# Patient Record
Sex: MALE | Race: BLACK OR AFRICAN AMERICAN | Employment: UNEMPLOYED | URBAN - METROPOLITAN AREA
[De-identification: names, ages, dates, MRNs, and addresses within clinical notes are randomized per-mention and may not be internally consistent; named-entity substitution may affect disease eponyms.]

---

## 2017-05-16 ENCOUNTER — HOSPITAL ENCOUNTER (EMERGENCY)
Age: 27
Discharge: HOME | End: 2017-05-16
Attending: FAMILY MEDICINE
Payer: SELF-PAY

## 2017-05-16 VITALS
RESPIRATION RATE: 16 BRPM | HEART RATE: 68 BPM | BODY MASS INDEX: 53.78 KG/M2 | SYSTOLIC BLOOD PRESSURE: 150 MMHG | TEMPERATURE: 98.9 F | HEIGHT: 64 IN | WEIGHT: 315 LBS | OXYGEN SATURATION: 98 % | DIASTOLIC BLOOD PRESSURE: 99 MMHG

## 2017-05-16 DIAGNOSIS — K08.89 DENTALGIA: ICD-10-CM

## 2017-05-16 DIAGNOSIS — R51.9 RIGHT FACIAL PAIN: Primary | ICD-10-CM

## 2017-05-16 PROCEDURE — 99282 EMERGENCY DEPT VISIT SF MDM: CPT

## 2017-05-16 RX ORDER — HYDROCODONE BITARTRATE AND ACETAMINOPHEN 5; 325 MG/1; MG/1
1-2 TABLET ORAL
Qty: 12 TAB | Refills: 0 | Status: SHIPPED | OUTPATIENT
Start: 2017-05-16 | End: 2020-05-11 | Stop reason: ALTCHOICE

## 2017-05-16 RX ORDER — AMOXICILLIN 500 MG/1
500 TABLET, FILM COATED ORAL 3 TIMES DAILY
Qty: 30 TAB | Refills: 0 | Status: SHIPPED | OUTPATIENT
Start: 2017-05-16 | End: 2017-05-26

## 2017-05-16 RX ORDER — HYDROXYZINE 50 MG/1
50 TABLET, FILM COATED ORAL
COMMUNITY

## 2017-05-16 RX ORDER — IBUPROFEN 600 MG/1
600 TABLET ORAL
Qty: 20 TAB | Refills: 0 | Status: SHIPPED | OUTPATIENT
Start: 2017-05-16

## 2017-05-16 NOTE — LETTER
[facility] [place] 
[facility] 
[address] [id number] 
[phone] Work/School Note Date: 5/16/2017 To Whom It May concern: 
 
Darlene Wheeler was seen and treated today in the emergency room by the following provider(s): 
Attending Provider: Alexsandra Graham MD 
Physician Assistant: Iron Bomwan PA-C. Please excuse from work, Darlene Wheeler of whom may return to work on 5/17/2017. Sincerely, Manuel Ventura PA-C

## 2017-05-17 NOTE — ED TRIAGE NOTES
Pt reports to ED c/c right lower dental pain onset 1 hour ago. Pt reports taking OTC medication with no relief.

## 2017-05-17 NOTE — ED PROVIDER NOTES
HPI Comments: 8:30 pm    Harish Carlisle is a 28 y.o. male presenting to the ED C/O gradually worsening severe right lower dental pain starting 1 hour ago. Pt has taken Excedrin without relief. Associated sxs include left ear pain and left-sided facial pain. Patient denies smoking cigarettes. Pt denies any other symptoms or complaints. Written by FAVIOLA Lance, as dictated by Parker Crouch PA-C        Patient is a 28 y.o. male presenting with dental problem. The history is provided by the patient. No  was used. Dental Pain    This is a new problem. The current episode started 1 to 2 hours ago. The problem occurs constantly. The problem has been gradually worsening. The pain is located in the right lower mouth. The pain is severe. Past Medical History:   Diagnosis Date    Psychiatric disorder     depression, anxiety       History reviewed. No pertinent surgical history. History reviewed. No pertinent family history. Social History     Social History    Marital status:      Spouse name: N/A    Number of children: N/A    Years of education: N/A     Occupational History    Not on file. Social History Main Topics    Smoking status: Never Smoker    Smokeless tobacco: Not on file    Alcohol use Yes    Drug use: Not on file    Sexual activity: Not on file     Other Topics Concern    Not on file     Social History Narrative    No narrative on file         ALLERGIES: Shellfish derived    Review of Systems   Constitutional: Negative for chills and fever. HENT: Positive for dental problem (right lower dental pain) and ear pain (right sided ear pain). Negative for congestion, facial swelling and sore throat. Right sided frontal facial pain   Musculoskeletal: Negative for joint swelling and myalgias. Neurological: Negative for headaches. Hematological: Negative for adenopathy.        Vitals:    05/16/17 2028   BP: (!) 150/99   Pulse: 68 Resp: 16   Temp: 98.9 °F (37.2 °C)   SpO2: 98%   Weight: 158.8 kg (350 lb)   Height: 5' 4\" (1.626 m)            Physical Exam   Constitutional: He appears well-developed and well-nourished. No distress. Male in NAD. Alert. HENT:   Head: Normocephalic and atraumatic. Head is without right periorbital erythema and without left periorbital erythema. Right Ear: External ear normal. No drainage or swelling. Tympanic membrane is not perforated, not erythematous and not bulging. Left Ear: External ear normal. No drainage or swelling. Tympanic membrane is not perforated, not erythematous and not bulging. Nose: Nose normal. No mucosal edema or rhinorrhea. Right sinus exhibits no maxillary sinus tenderness and no frontal sinus tenderness. Left sinus exhibits no maxillary sinus tenderness and no frontal sinus tenderness. Mouth/Throat: Uvula is midline, oropharynx is clear and moist and mucous membranes are normal. No oral lesions. No trismus in the jaw. Dental caries present. No dental abscesses or uvula swelling. No oropharyngeal exudate, posterior oropharyngeal edema, posterior oropharyngeal erythema or tonsillar abscesses. Eyes: Conjunctivae are normal.   Neck: Normal range of motion. Cardiovascular: Normal rate, regular rhythm, normal heart sounds and intact distal pulses. Exam reveals no gallop and no friction rub. No murmur heard. Pulmonary/Chest: Effort normal. No accessory muscle usage. No tachypnea. He has no decreased breath sounds. He has no rhonchi. Musculoskeletal: Normal range of motion. Lymphadenopathy:     He has no cervical adenopathy. Neurological: He is alert. Skin: Skin is warm. He is not diaphoretic. Nursing note and vitals reviewed.      RESULTS:      PULSE OXIMETRY NOTE:  Pulse-ox is 98% on Room air  Interpretation: normal       No orders to display        Labs Reviewed - No data to display    No results found for this or any previous visit (from the past 12 hour(s)). MDM  Number of Diagnoses or Management Options  Dentalgia:   Right facial pain:   Diagnosis management comments: Dental caries, dental abscess, dental fx, TMJ, OM, sinusitis. ED Course     Medications - No data to display  . Procedures                 PROGRESS NOTE:  8:30 pm  Initial assessment performed. Written by Anita Hernandez ED Scribe, as dictated by Kaycee Roque PA-C    Will tx for dental caries. No abscess. FU with dentist. Reasons to RTED discussed with pt. All questions answered. Pt feels comfortable going home at this time. Pt expressed understanding and she agrees with plan. DISCHARGE NOTE:  8:44 PM  Akiko Ji  results have been reviewed with him. He has been counseled regarding his diagnosis, treatment, and plan. He verbally conveys understanding and agreement of the signs, symptoms, diagnosis, treatment and prognosis and additionally agrees to follow up as discussed. He also agrees with the care-plan and conveys that all of his questions have been answered. I have also provided discharge instructions for him that include: educational information regarding their diagnosis and treatment, and list of reasons why they would want to return to the ED prior to their follow-up appointment, should his condition change. The patient and/or family has been provided with education for proper Emergency Department utilization. CLINICAL IMPRESSION:    1. Right facial pain    2. Dentalgia        PLAN: DISCHARGE HOME    Follow-up Information     Follow up With Details Comments Contact Info    A dentist from the list provided       [facility] EMERGENCY DEPT  As needed, If symptoms worsen [address] [place] [id number]  [phone]          Discharge Medication List as of 5/16/2017  8:41 PM      START taking these medications    Details   amoxicillin 500 mg tab Take 500 mg by mouth three (3) times daily for 10 days. , Print, Disp-30 Tab, R-0      HYDROcodone-acetaminophen (NORCO) 5-325 mg per tablet Take 1-2 Tabs by mouth every four (4) hours as needed for Pain. Max Daily Amount: 12 Tabs., Print, Disp-12 Tab, R-0      ibuprofen (MOTRIN) 600 mg tablet Take 1 Tab by mouth every six (6) hours as needed for Pain. Take with food. , Print, Disp-20 Tab, R-0         CONTINUE these medications which have NOT CHANGED    Details   hydrOXYzine HCl (ATARAX) 50 mg tablet Take 50 mg by mouth every six (6) hours as needed for Anxiety. Indications: anxiety, Historical Med             ATTESTATIONS:  This note is prepared by Zaki Chin, acting as Scribe for Erica Hilliard PA-C . Erica Hilliard PA-C : The scribe's documentation has been prepared under my direction and personally reviewed by me in its entirety. I confirm that the note above accurately reflects all work, treatment, procedures, and medical decision making performed by me.

## 2017-05-17 NOTE — ED NOTES
Discharge instructions given to pt. pt verbalized understanding discharge instructions. Patient left emergency department by foot  With self, in good condition. 3 Prescriptions given. Armband removed and shredded.

## 2017-05-17 NOTE — DISCHARGE INSTRUCTIONS
Head or Face Pain: Care Instructions  Your Care Instructions  Common causes of head or face pain are allergies, stress, and injuries. Other causes include tooth problems and sinus infections. Eating certain foods, such as chocolate or cheese, or drinking certain liquids, such as coffee or cola, can cause head pain for some people. If you have mild head pain, you may not need treatment. It is important to watch your symptoms and talk to your doctor if your pain continues or gets worse. Follow-up care is a key part of your treatment and safety. Be sure to make and go to all appointments, and call your doctor if you are having problems. It's also a good idea to know your test results and keep a list of the medicines you take. How can you care for yourself at home? · Take pain medicines exactly as directed. ¨ If the doctor gave you a prescription medicine for pain, take it as prescribed. ¨ If you are not taking a prescription pain medicine, ask your doctor if you can take an over-the-counter pain medicine. · Take it easy for the next few days or longer if you are not feeling well. · Use a warm, moist towel or heating pad set on low to relax tight muscles in your shoulder and neck. Have someone gently massage your neck and shoulders. · Put ice or a cold pack on the area for 10 to 20 minutes at a time. Put a thin cloth between the ice and your skin. When should you call for help? Call 911 anytime you think you may need emergency care. For example, call if:  · You have twitching, jerking, or a seizure. · You passed out (lost consciousness). · You have symptoms of a stroke. These may include:  ¨ Sudden numbness, tingling, weakness, or loss of movement in your face, arm, or leg, especially on only one side of your body. ¨ Sudden vision changes. ¨ Sudden trouble speaking. ¨ Sudden confusion or trouble understanding simple statements. ¨ Sudden problems with walking or balance.   ¨ A sudden, severe headache that is different from past headaches. · You have jaw pain and pain in your chest, shoulder, neck, or arm. Call your doctor now or seek immediate medical care if:  · You have a fever with a stiff neck or a severe headache. · You have nausea and vomiting, or you cannot keep food or liquids down. Watch closely for changes in your health, and be sure to contact your doctor if:  · Your head or face pain does not get better as expected. Where can you learn more? Go to http://dayana-liam.info/. Enter P568 in the search box to learn more about \"Head or Face Pain: Care Instructions. \"  Current as of: May 27, 2016  Content Version: 11.2  © 0684-7273 [employer]. Care instructions adapted under license by [employer] (which disclaims liability or warranty for this information). If you have questions about a medical condition or this instruction, always ask your healthcare professional. [place] [address] any warranty or liability for your use of this information. Dental Pain: After Your Visit  Your Care Instructions  The most common cause of dental pain is tooth decay. It can also be caused by an infection of the tooth (abscess) or gum, a tooth that has not broken all the way through the gum (impacted tooth), or a problem with the nerve-filled center of the tooth. Follow-up care is a key part of your treatment and safety. Be sure to make and go to all appointments, and call your doctor if you are having problems. Its also a good idea to know your test results and keep a list of the medicines you take. How can you care for yourself at home? · Contact a dentist for follow-up care. · Put ice or a cold pack on the outside of your mouth for 10 to 20 minutes at a time to reduce pain and swelling. Put a thin cloth between the ice and your skin.   · Take an over-the-counter pain medicine, such as acetaminophen (Tylenol), ibuprofen (Advil, Motrin), or naproxen (Brooks). Read and follow all instructions on the label. · Do not take two or more pain medicines at the same time unless the doctor told you to. Many pain medicines have acetaminophen, which is Tylenol. Too much acetaminophen (Tylenol) can be harmful. · Rinse your mouth with warm salt water every 2 hours to help relieve pain and swelling from an infected tooth. Mix 1 teaspoon of salt in 8 ounces of water. · If your doctor prescribed antibiotics, take them as directed. Do not stop taking them just because you feel better. You need to take the full course of antibiotics. When should you call for help? Call your doctor now or seek immediate medical care if:  · You have signs of infection, such as:  ¨ Increased pain, swelling, warmth, or redness. ¨ Pus draining from the gum, tooth, or face. ¨ A fever. Watch closely for changes in your health, and be sure to contact your doctor if:  · You do not get better as expected. Where can you learn more? Go to kontoblick.be  Enter V264 in the search box to learn more about \"Dental Pain: After Your Visit. \"   © 0121-3404 [employer]. Care instructions adapted under license by [employer] (which disclaims liability or warranty for this information). This care instruction is for use with your licensed healthcare professional. If you have questions about a medical condition or this instruction, always ask your healthcare professional. [address] any warranty or liability for your use of this information. Content Version: 42.1.258782;  Last Revised: May 17, 2013

## 2020-05-11 ENCOUNTER — VIRTUAL VISIT (OUTPATIENT)
Dept: SURGERY | Age: 30
End: 2020-05-11

## 2020-05-11 VITALS — BODY MASS INDEX: 53.78 KG/M2 | HEIGHT: 64 IN | WEIGHT: 315 LBS

## 2020-05-11 DIAGNOSIS — E66.01 MORBID OBESITY (HCC): ICD-10-CM

## 2020-05-11 DIAGNOSIS — E66.01 MORBID OBESITY WITH BMI OF 70 AND OVER, ADULT (HCC): ICD-10-CM

## 2020-05-11 DIAGNOSIS — K21.9 GASTROESOPHAGEAL REFLUX DISEASE, ESOPHAGITIS PRESENCE NOT SPECIFIED: ICD-10-CM

## 2020-05-11 DIAGNOSIS — G47.30 SLEEP APNEA, UNSPECIFIED TYPE: ICD-10-CM

## 2020-05-11 DIAGNOSIS — F99 PSYCHIATRIC DISORDER: ICD-10-CM

## 2020-05-11 DIAGNOSIS — M25.561 ARTHRALGIA OF BOTH KNEES: ICD-10-CM

## 2020-05-11 DIAGNOSIS — E66.01 MORBID (SEVERE) OBESITY DUE TO EXCESS CALORIES (HCC): Primary | ICD-10-CM

## 2020-05-11 DIAGNOSIS — M25.562 ARTHRALGIA OF BOTH KNEES: ICD-10-CM

## 2020-05-11 RX ORDER — FUROSEMIDE 20 MG/1
20 TABLET ORAL DAILY
COMMUNITY

## 2020-05-11 RX ORDER — ESCITALOPRAM OXALATE 20 MG/1
20 TABLET ORAL DAILY
COMMUNITY

## 2020-05-11 RX ORDER — OMEPRAZOLE 20 MG/1
20 CAPSULE, DELAYED RELEASE ORAL DAILY
COMMUNITY

## 2020-05-11 RX ORDER — POTASSIUM CHLORIDE 750 MG/1
TABLET, FILM COATED, EXTENDED RELEASE ORAL
COMMUNITY

## 2020-05-11 NOTE — PROGRESS NOTES
Bariatric Surgery Consultation    Subjective: The patient is a 31 y.o. obese male with a Body mass index is 85.48 kg/m². .  The patient is at his heaviest weight for the past 3 years. he has been overweight since age a young child. he has been considering surgery since last year. he desires surgery at this time because of multiple health concerns and their lifestyle issues which are hindered by their weight. he has been referred by his family physician Dr Mckay Dent for evaluation and treatment of their obesity via surgical intervention. Hermann Merchant has tried multiple diets in his lifetime most recently tried physician supervised, behavior modification, Weight Watchers and Atkins. Over the past 3 years or so he has put on more than 150 pounds due to poor eating behavior, his depression and lack of activity. He is desperate to get healthy again and has been  Urged by his PCP to come and see us. Bariatric comorbidities present are   Patient Active Problem List   Diagnosis Code    Morbid obesity ([facility] [address] E66.01    Morbid obesity with BMI of 70 and over, adult ([facility] [address] E66.01, Z68.45    Sleep apnea G47.30    GERD (gastroesophageal reflux disease) K21.9    Arthritic-like pain M25.50    Psychiatric disorder F99       The patient is considering laparoscopic sleeve gastrectomy  followed by a possible gastric bypass for surgical weight loss due to their ineffective progress with medical forms of weight loss and the urging of their physician who cares for their primary medical issues. The patient  now presents  for consideration for weight loss surgery understanding the benefits of this over a medical approach of weight loss as was discussed in our presentation on weight loss surgery. They have discussed their plans both with their family and primary care physician who is in support of their pursuit of such.  The patient has not had health issues as of late and denies and gastrointestinal disturbances other than what is outlined below in their review of symptoms. All of their prior evaluations available by both their PCP's and specialists physicians have been reviewed today either in the Care Everywhere portal or scanned under the media tab. I have spent a large portion of my initial consultation today reviewing the patients current dietary habits which have contributed to their health issues and obesity. I have suggested to them personally a dietary regimen that they can initiate now to help with their status as it pertains to their weight. They understand that the most important aspect of their journey through their weight loss endeavor will be their adherence to a new lifestyle of healthy eating behavior. They also understand that an adherence to an exercise program will not only help with weight loss but is ultimately important in weight maintenance. The patients goal weight is 180lb. These goals are consistent with expected outcomes of their desired operation with a massive pre operative weight loss program and a combination sleeve gastrectomy followed by a secondary gastric bypass. his Medical goals are resolution of these health issues. Patient Active Problem List    Diagnosis Date Noted    Arthritic-like pain     Psychiatric disorder     Morbid obesity ([facility] [address]     Morbid obesity with BMI of 70 and over, adult ([facility] [address]     Sleep apnea     GERD (gastroesophageal reflux disease)      No past surgical history on file. Social History     Tobacco Use    Smoking status: Never Smoker   Substance Use Topics    Alcohol use: Yes      No family history on file. Current Outpatient Medications   Medication Sig Dispense Refill    furosemide (Lasix) 20 mg tablet Take 20 mg by mouth daily.  omeprazole (PRILOSEC) 20 mg capsule Take 20 mg by mouth daily.  potassium chloride SR (KLOR-CON 10) 10 mEq tablet Take  by mouth.       escitalopram oxalate (Lexapro) 20 mg tablet Take 20 mg by mouth daily.      hydrOXYzine HCl (ATARAX) 50 mg tablet Take 50 mg by mouth every six (6) hours as needed for Anxiety. Indications: anxiety      ibuprofen (MOTRIN) 600 mg tablet Take 1 Tab by mouth every six (6) hours as needed for Pain. Take with food.  20 Tab 0     Allergies   Allergen Reactions    Shellfish Derived Swelling          Review of Systems:       General - No history or complaints of unexpected fever, chills, or weight loss  Head/Neck - No history or complaints of headache, diplopia, dysphagia, hearing loss  Cardiac - No history or complaints of chest pain, palpitations, murmur, or shortness of breath  Pulmonary - No history or complaints of shortness of breath, productive cough, hemoptysis  Gastrointestinal - moderate reflux,no  abdominal pain, obstipation/constipation or blood per rectum  Genitourinary - No history or complaints of hematuria/dysuria, stress urinary incontinence symptoms, or renal lithiasis  Musculoskeletal - sever joint pain in their knees,  no muscular weakness  Hematologic - No history or complaints of bleeding disorders,  No blood transfusions  Neurologic - No history or complaints of  migraine headaches, seizure activity, syncopal episodes, TIA or stroke  Integumentary - No history or complaints of rashes, abnormal nevi, skin cancer  Gynecological - n/a               Objective:     Visit Vitals   5' 4\" (1.626 m)   Wt (!) 225.9 kg (498 lb)   BMI 85.48 kg/m²       Physical Examination:      Physical Examination: General appearance - alert, well appearing, and in no distress and oriented to person, place, and time  Mental status - alert, oriented to person, place, and time, normal mood, behavior, speech, dress, motor activity, and thought processes  Eyes - pupils equal and reactive, extraocular eye movements intact, sclera anicteric, left eye normal, right eye normal  Ears - right ear normal, left ear normal  Neck- good extension and flexion, no obvious swelling  Chest - good air movement  Heart - N/A  Abdomen - no obvious distension, massive central obesity noted  Neurological - alert, oriented, normal speech, no focal findings or movement disorder noted  Musculoskeletal - no swelling noted  Extremities - normal movement    Labs:       No results found for this or any previous visit (from the past 1440 hour(s)). Assessment:     Morbid obesity with comorbidity    Plan:     laparoscopic sleeve gastrectomy with secondary gastric bypass    This is a 31 y.o. male with a BMI of Body mass index is 85.48 kg/m². and the weight-related co-morbidties as noted below. Marilin Crain meets the NIH criteria for bariatric surgery based upon the BMI of Body mass index is 85.48 kg/m². and multiple weight-related co-morbidties. Marilin Crain has elected laparoscopic sleeve gastrectomy as his intervention of choice for treatment of morbid obestiy through surgical means secondary to its safety profile, rapid return to work  and decreases in operative risks over gastric bypass. In the office today, following Emmanuel's history and physical examination, a 30 minute discussion regarding the anatomic alterations for the laparoscopic sleeve gastrectomy was undertaken. The dietary expectations and the patient and physician dependent factors for success were thoroughly discussed, to include the need for interval follow-up and long-term dietary changes associated with success. The possible complications of the sleeve gastrectomy  were also discussed, to include;death, DVT/PE, staple line leak, bleeding, stricture formation, infection, nutritional deficiencies and sleeve dilation. Specific weight related outcomes for success were also discussed with an emphasis on careful and close follow-up with the first year and eating behavior modification as the baseline and cyclical hunger return. The patient expressed an understanding of the above factors, and his questions were answered in their entirety.     In addition, the patient attended a 1.5 hour power point seminar regarding obesity, surgical weight loss including, adjustable gastric band, gastric bypass, and sleeve gastrectomy. This discussion contrasted the different surgical techniques, mechanisms of actions and expected outcomes, and surgical and medical risks associated with each procedure. During this seminar, there was a long question and answer session where each questions was answered until there were no additional questions. Today, the patient had all of his questions answered and desires to proceed with  bariatric surgery initially choosing sleeve gastrectomy as his surgical option. The patient will require a massive amount of weight loss pre operatively to allow for a safe transition into the operating room. We will have to follow him closely for this over multiple months until we reach the desired weight loss goal.    Secondary Diagnoses:     Dietary Intervention  - The patient is currently scheduled to see or has been followed by a bariatric nutritionist for an attempt at preoperative weight loss as has been dictated by their insurance carrier. They will be assessed at various times during their follow up to evaluate their progress depending on the length of time that is required once again by their carrier. I have explained the importance of   preoperative weight loss and the benefits regarding lower surgical risk and also assisting the patient in reaching their weight loss goal. They understand also that they should participate in an  exercise program to assist in this weight loss. Finally they understand there is a physiologic benefit from the standpoint of hepatic volume reduction and reduction of central visceral adiposity   preoperatively.   I have reiterated the importance of a low carbohydrate and high protein regimen to achieve their   stated goal. I have reviewed their current eating behavior prior to this encounter and explained to them in an exhaustive fashion the appropriate diet that they should adhere to. They have been   encouraged to loose weight pre operatively and understand it is our prerogative to cancel surgery or postpone their procedure in the event of significant weight gain. The patients weight loss goal pre operatively could be in the range of 60-80 pounds or more. GERD -The patient understands that weight loss surgery is not a guaranteed cure for reflux disease but does understand the benefits that weight loss can have on reflux disease. They also understand that at the time of surgery the gastroesophageal junction will be evaluated for the presence of a diaphragmatic hernia. Hernias will be corrected always with the surgical procedure if possible and is deemed safe. The patient also understands that neither weight loss surgery nor repair of a diaphragmatic hernia repair guarantees the complete cessation of the disease. They also understand there is a possibility of recurrence of these hernias with a simple crural repair as is performed with these procedures. They understand they may have to continue their medications in the postoperative period. They have a good understanding that the gastric bypass procedure is better suited to total resolution of this issue and that neither the Lap Band nor sleeve gastrectomy is considered a curative procedure as it pertains to this diagnosis. Obstructive Sleep Apnea -The patient understands the association of sleep apnea and obesity and the additional risk that it caries related to post surgical complications. If they have not been tested for sleep apnea and I feel they are at increased risk for this diagnosis, then they will be scheduled for a consultation with a Pulmonologist for such.  In the event that they samira this diagnosis we will have the patient bring their CPAP machine to the hospital for use both postoperatively in the PACU and on the floor at its appropriate setting.  We will have them continue using it while at home after surgery and follow up with their pulmonologist 6 months after to be retested to see if it can be discontinued at that time period. Signed By: Aflredo Fournier MD     May 12, 2020       This visit with Ap Crews was performed under virtual telemedicine guidelines during the coronavirus (PTUII-24) public health emergency on 5/11/2020 in an interactive   fashion using [employer] me. They understand that this telemedicine encounter is a billable service, with coverage determined by their insurance carrier. They are aware that   they may receive a bill and have provided verbal consent for this virtual visit. This visit was performed with the patient in their home environment and provider was   present at [facility]. I have spent over 60 minutes on this visit  both prior to the visits reviewing the patients chart and with the patient face to face. I have reviewed their medical history, performed a telemedicine physical examination, and discussed the plan of action to date. They understand that they will be asked   to come to the office when our office is allowed normal patient interaction, as dictated by public health officials, for a face-to-face visit to rediscuss all of the things we  have talked about today. During this visit we discussed the varieties of surgeries that we perform, how they would impact the patient from a weight loss standpoint   considering their medical issues and prior surgeries, and also the restrictions that the patient would have long-term with the operation that they have chosen.     Ally Hercules M.D.  5/12/2020

## 2020-06-02 ENCOUNTER — OFFICE VISIT (OUTPATIENT)
Dept: SURGERY | Age: 30
End: 2020-06-02

## 2020-06-02 VITALS — HEIGHT: 64 IN | WEIGHT: 315 LBS | BODY MASS INDEX: 53.78 KG/M2

## 2020-06-02 DIAGNOSIS — E66.01 MORBID (SEVERE) OBESITY DUE TO EXCESS CALORIES (HCC): Primary | ICD-10-CM

## 2020-06-02 NOTE — PROGRESS NOTES
Medical Weight Loss Multi-Disciplinary Program    Name: Elyse Moeller   : 1990    Session# 1  Date: 2020    Dietitian: Sneha Nair is a 31 y.o. male who present for a pre-op evaluation. Visit Vitals  Ht 5' 4\" (1.626 m)   Wt (!) 233.6 kg (515 lb)   BMI 88.40 kg/m²       Past Medical History:   Diagnosis Date    Arthritic-like pain     GERD (gastroesophageal reflux disease)     Morbid obesity ([facility] [address]     Morbid obesity with BMI of 70 and over, adult ([facility] [address]     Psychiatric disorder     depression, anxiety    Sleep apnea            Procedure:  laparoscopic sleeve gastrectomy     Reasons for Surgery:  BMI > 40 with one or more medically significant comorbidities and JET    Summary:  Pt given brief pre/post-op diet ed and diet hx reviewed. Pt instructed to follow a low calorie, low carbohydrate, high protein diet of about 6046-8976 calories daily. Pt set several goals. See below. What have you done in the past to try to lose weight? Calorie controlled diet, Herbalife, Slimfast program, Atkins diet, exercise program    Why didn't you lose weight or keep the weight off?: Patient feels that with previous weight loss attempts have not been as successful due to history of depression. Why do you think having weight loss surgery will make it possible for you to lose weight and keep it off? Patient hopes that portion control will be the best option to help control his weight - he is motivated to take control of his health     Dietary Instructions    Nutritional Hx: What is the number of meals you eat per day? 2  Comment:     Do you eat between meals / snack? yes  Typical snack: saltine -(not daily)     How fast do you eat your meals? rapid    How often do you eat fast food? occasionally    How many sodas/sugared beverages do you drink per day? 1-2 cans of diet soda per day    How many caffeinated drinks do you have per day?  1-2 cans of diet soda per day    How much milk and/or juice do you drink per day? 10-12 oz with slim fast shake    How much water do you drink per day? 4 (8oz glasses)    How often do you consume alcohol? 1 times a week; 2 Mixed drinks    Current Vitamins: Multivitamin     Diet History:    Typical intake is as follows:  Wake-up 9:00-10:00 am   Meal 1 - 11:00 am Slimfast shake powder with 2% milk   Meal 2 - 6:00 pm - 2 chicken breast with mac and cheese   Fluids: Water,  Diet coke - patient fluid goal 64 oz     Reviewed intake  Understanding low carbohydrates, low sugar, higher protein meals  Understanding proper portions  Dining outside home  Instruction given for personal dietary changes  Discussed perceived compliance  Comments: Pt given brief pre/post-op diet ed and diet hx reviewed. Patient Education and Materials Provided:  Supplement [facility], B Vitamin Information, MVI Recommendations, Calcium Citrate Information, Bariatric Supplement Companies, Protein Supplement Information, Fluid Requirements, No Caffeine or Carbonation, No Alcohol for One Year Post Op, 3 Balanced Meals a Day, Food Group Guide, Good Choices Dining Out, No Snacks, No Concentrated Sweets, Support System at Home, Exercising, Support Group Information and Addressed Current Habits / Changes to make     Physical Activity/Exercise    Discussed Perceived Compliance  Reasonable Goals Set  Motivation  Comments: none    Exercise:  Do you currently have an exercise routine? no    Goals:   1. Work to increase to 3-4 small meals per day, with planned snacks as needed. Recommend following plate method for meal planning - focusing on lean protein, non-starchy vegetables, and measured amounts of starch. - Goal of  g protein and 50-75 g carbohydrate per day. - Recommend continuing protein supplement as meal replacement at least 1x/day  2. Increase non caloric fluid to 64 oz per day.   Eliminate caffeine, added sugar, carbonation, and straws.               -Continue to work to decrease sugar sweetened beverages - goal of calorie free beverages only              -Must eliminate caffeine prior to surgery and avoid for ~6-8 weeks  3. Start activity regimen, work to increase ADL              -Try to start walking OR chair exercises for at least 30-60  minutes 3-5 days per week  4.  Start Complete MVI    Candidate for surgery (per RD): {PENDING   Marquis Box, RD  06/02/20

## 2020-07-01 ENCOUNTER — CLINICAL SUPPORT (OUTPATIENT)
Dept: SURGERY | Age: 30
End: 2020-07-01

## 2020-07-01 VITALS — HEIGHT: 64 IN | BODY MASS INDEX: 53.78 KG/M2 | WEIGHT: 315 LBS

## 2020-07-01 DIAGNOSIS — E66.01 MORBID (SEVERE) OBESITY DUE TO EXCESS CALORIES (HCC): Primary | ICD-10-CM

## 2020-07-01 NOTE — PROGRESS NOTES
Medical Weight Loss Multi-Disciplinary Program    Name: Harjit Merchant   : 1990    Session# 2  Date: 2020     Visit Vitals  Ht 5' 4\" (1.626 m)   Wt (!) 233.6 kg (514 lb 15.9 oz)   BMI 88.40 kg/m²       Dietary Instructions    Reviewed intake  Understanding low carbohydrates, low sugar, higher protein meals  Instruction given for personal dietary changes  Discussed perceived compliance  Comments: Diet hx reviewed and personal dietary changes discussed. Reviewed recommendation to follow [id number] calorie diet, working to reduce total carbohydrate intake to  g or less per day and increasing protein intake to  g per day, compared current intake to recommendations. Diet History:    Wake-up 9:00-10:00 am   Meal 1 - 11:00 am Slimfast shake powder with 2% milk every other day in place of meal or premier protein powder with unsweetened almond milk  Meal 2 - 6:00 pm - 2 chicken breast with green beans    Fluids: Water,  Diet coke - patient fluid goal 64 oz     - cut out fried foods and no fast food. Eating a lot of chicken breast without skin and fish. Pt's biggest accomplishments is cutting out fast food. Nutritional Hx: What is the number of meals you eat per day? 2  Comment:     Do you eat between meals / snack? yes  Typical snack: saltine -(not daily)     How fast do you eat your meals? rapid    How often do you eat fast food? occasionally    How many sodas/sugared beverages do you drink per day? 1-2 cans of diet soda per day -eliminated soda for this week. Using sugar free water flavoring packets. How many caffeinated drinks do you have per day? 1-2 cans of diet soda per day     How much milk and/or juice do you drink per day? 10-12 oz unsweetened almond milk with slim fast shake     How much water do you drink per day? Improved = 4, 16 oz bottles of water/day (improved from 4, 8oz glasses/day)    How often do you consume alcohol? 1 times a week; 2 Mixed drinks.  Pt has been made aware to eliminate/reduce alcohol. Current Vitamins: Multivitamin       Physical Activity/Exercise    Discussed Perceived Compliance  Motivation    Patient has started physical activity regimen, reinforced the importance of regular physical activity for total health and weight management. Youtube chair exercises 3x/week, 15 min (arms and legs) for 1.5 weeks. Suggested starting increasing PA for at least 3 days per week for 30 minutes, patient was receptive to recommendation. Pt states biggest barrier to increasing exercise is \"laziness\". Problem solved for ways to overcome this barrier - pt will schedule his exercise routine for 30 min. 3-5d/week. Behavior Modification    Identify obstacles to trigger change  Achieving/Rewarding goals met  Positive attitude  Comments: Reinforced importance continuing to modify lifestyle patterns and behaviors to promote weight loss and long term weight maintenance       Goals:   1. Work to increase to 3-4 small meals per day, with planned snacks as needed. Recommend following plate method for meal planning - focusing on lean protein, non-starchy vegetables, and measured amounts of starch. - Goal of  g protein and  g carbohydrate per day. - Recommend continuing protein supplement as meal replacement at least 1x/day OR as high protein snack option  2. Increase non caloric fluid to 64 oz per day. Eliminate caffeine, added sugar, carbonation, and straws.               -Continue to work to decrease sugar sweetened beverages - goal of calorie free beverages only              -Must eliminate caffeine prior to surgery and avoid for ~6-8 weeks   -Practice 30:30 rule,  food and flood   3. Start activity regimen, work to increase ADL  4. Start Complete MVI    Candidate for surgery (per RD):  PENDING    Dietitian: German Hinkle RD

## 2020-07-29 ENCOUNTER — VIRTUAL VISIT (OUTPATIENT)
Dept: SURGERY | Age: 30
End: 2020-07-29

## 2020-07-29 VITALS — BODY MASS INDEX: 53.78 KG/M2 | HEIGHT: 64 IN | WEIGHT: 315 LBS

## 2020-07-29 DIAGNOSIS — E66.01 MORBID OBESITY WITH BMI OF 70 AND OVER, ADULT (HCC): ICD-10-CM

## 2020-07-29 DIAGNOSIS — M25.562 ARTHRALGIA OF BOTH KNEES: ICD-10-CM

## 2020-07-29 DIAGNOSIS — M25.561 ARTHRALGIA OF BOTH KNEES: ICD-10-CM

## 2020-07-29 DIAGNOSIS — G47.30 SLEEP APNEA, UNSPECIFIED TYPE: ICD-10-CM

## 2020-07-29 DIAGNOSIS — K21.9 GASTROESOPHAGEAL REFLUX DISEASE, ESOPHAGITIS PRESENCE NOT SPECIFIED: ICD-10-CM

## 2020-07-29 DIAGNOSIS — E66.01 MORBID OBESITY (HCC): Primary | ICD-10-CM

## 2020-07-29 NOTE — PATIENT INSTRUCTIONS
Patient Instructions 1. Continue to monitor carbohydrate and protein intake- remember to keep your           total  carbohydrates to 50 grams or less per day for best results. 2. Remember hydration goals - usually 48 to 64 ounces of liquids per day 3. Continue to work towards exercise goals - minimum 3 days per week of 45          minutes to  1 hour at a time. 4. Remember to take vitamins as directed Supplement Resource Guide Importance of Protein:  
Maintains lean body mass, produces antibodies to fight off infections, heals wounds, minimizes hair loss, helps to give you energy, helps with satiety, and keeping you full between meals. Importance of Calcium: 
Needed for healthy bones and teeth, normal blood clotting, and nervous system functioning, higher risk of osteoporosis and bone disease with non-compliance. Importance of Multivitamins: Many functions. Supply you with extra nutrients that you may be missing from food. May lead to iron deficiency anemia, weakness, fatigue, and many other symptoms with non-compliance. Importance of B Vitamins: 
Important for red blood cell formation, metabolism, energy, and helps to maintain a healthy nervous system. Protein Supplement Find one you like now. Use immediately after surgery. Look for: 
35-50g protein each day from your protein supplement once you reach the progression diet. 0-3 g fat per serving 0-3 g sugar per serving Protein drinks should be split in separate dosages. Recommend: Lifelong 1 year + Calcium Supplement:  
 
Start taking within a month after surgery. Look for: Calcium Citrate Plus D (1500 mg per day) Recommend: Citracal 
 
 . Avoid chocolate chewable calcium. Can use chewable bariatric or [employer] brand or similar chewable. The body cannot absorb more than 500-600 mg @ a time. Take for Life Multi-vitamin Supplement: 1st Month After Surgery: Any complete chewable, such as: [place]s Complete chewables. Avoid [place] sours or gummies. They lack iron and other important nutrients and also have added sugar. Continue with chewable vitamin or change to adult complete multivitamin one month after surgery. Menstruating women can take a prenatal vitamin. Make sure has at least 18 mg iron and 547-167 mcg folic acid): Vitamin B12, B Complex Vitamin, and Biotin Start taking within a month after surgery. Vitamin B12:  1000 mcg of Vitamin B12 three times weekly Must take sublingually (meaning you take it under your tongue) or in a liquid drop form for easy absorption. B Complex Vitamin: Take a pill or liquid drop form once daily. Biotin: This vitamin can help prevent hair loss. Recommend 5mg  
(5000 mcg) a day Biotin is Optional

## 2020-07-29 NOTE — PROGRESS NOTES
Pre-Operative Progress Consultation  Video Encounter due to CV-19 crisis    Subjective:     Larry William is a 31 y.o. obese male with a Body mass index is 85.48 kg/m². .  he desires surgery at this time because of health issues and quality of life issues. Larry William has tried multiple diets in his lifetime most recently tried physician supervised, behavior modification and unsupervised diets  Bariatric comorbidities present are   Patient Active Problem List   Diagnosis Code    Morbid obesity ([facility] [address] E66.01    Morbid obesity with BMI of 70 and over, adult ([facility] [address] E66.01, Z68.45    Sleep apnea G47.30    GERD (gastroesophageal reflux disease) K21.9    Arthritic-like pain M25.50    Psychiatric disorder F99    Smoking history Z87.891    Vapes nicotine containing substance Z72.0     The patient desires 2-stage sleeve to bypass for surgical weight loss. Larry William is here today to check progress with weight loss / evaluate nutritional status and review all subspecialty clearances in hopes of proceeding to the operating room. Patient Active Problem List    Diagnosis Date Noted    Smoking history     Vapes nicotine containing substance     Arthritic-like pain     Psychiatric disorder     Morbid obesity ([facility] [address]     Morbid obesity with BMI of 70 and over, adult ([facility] [address]     Sleep apnea     GERD (gastroesophageal reflux disease)       Past Surgical History:   Procedure Laterality Date    HX WISDOM TEETH EXTRACTION        Social History     Tobacco Use    Smoking status: Former Smoker     Types: Cigarettes     Last attempt to quit: 2020     Years since quittin.2    Smokeless tobacco: Never Used   Substance Use Topics    Alcohol use: Yes      No family history on file. Current Outpatient Medications   Medication Sig Dispense Refill    furosemide (Lasix) 20 mg tablet Take 20 mg by mouth daily.  omeprazole (PRILOSEC) 20 mg capsule Take 20 mg by mouth daily.       potassium chloride SR (KLOR-CON 10) 10 mEq tablet Take  by mouth.  escitalopram oxalate (Lexapro) 20 mg tablet Take 20 mg by mouth daily.  hydrOXYzine HCl (ATARAX) 50 mg tablet Take 50 mg by mouth every six (6) hours as needed for Anxiety. Indications: anxiety      ibuprofen (MOTRIN) 600 mg tablet Take 1 Tab by mouth every six (6) hours as needed for Pain. Take with food.  20 Tab 0     Allergies   Allergen Reactions    Shellfish Derived Swelling          Review of Systems:        General - No history or complaints of unexpected fever, chills, or weight loss  Head/Neck - No history or complaints of headache, diplopia, dysphagia, hearing loss  Cardiac - No history or complaints of chest pain, palpitations, murmur, or shortness of breath  Pulmonary - No history or complaints of shortness of breath, productive cough, hemoptysis  Gastrointestinal - No history or complaints of reflux,  abdominal pain, obstipation/constipation, blood per rectum  Genitourinary - No history or complaints of hematuria/dysuria, stress urinary incontinence symptoms, or renal lithiasis  Musculoskeletal - No history or complaints of joint pain or muscular weakness  Hematologic - No history or complaints of bleeding disorders, blood transfusions, sickle cell anemia  Neurologic - No history or complaints of  migraine headaches, seizure activity, syncopal episodes, TIA or stroke  Integumentary - No history or complaints of rashes, abnormal nevi, skin cancer    Objective:     Visit Vitals   5' 4\" (1.626 m)   Wt (!) 225.9 kg (498 lb)   BMI 85.48 kg/m²     Physical Examination: General appearance - alert, well appearing, and in no distress and oriented to person, place, and time  Mental status - alert, oriented to person, place, and time, normal mood, behavior, speech, dress, motor activity, and thought processes  Eyes - pupils equal and reactive, extraocular eye movements intact, sclera anicteric, left eye normal, right eye normal  Ears - right ear normal, left ear normal  Neck- good extension and flexion, no obvious swelling  Chest - good air movement  Heart - N/A  Abdomen - no obvious distension, scars as noted:   Neurological - alert, oriented, normal speech, no focal findings or movement disorder noted  Musculoskeletal - no swelling noted  Extremities - normal movement      Labs:             Assessment:     Morbid obesity with associated comorbidity     Plan:     Continuation of Pre-Operative evaluation / clearance. Dimitris Davis has returned to the office today to discuss his status as a surgical candidate. his progress has been noted and reviewed. We will continue the pre-operative process and work towards goals as outlined. he has 60-70 more pounds to lose before proceeding to the OR.  (0 pounds lost since initial consult visit 2.5 months ago)  he has 4 more nutritional visits to complete before proceeding to the OR  he has an outstanding psych clearance to review before proceeding to the OR. Dimitris Davis understand the rationales for all the above. It has been discussed that given his obese condition that the best surgical option for this patient would be the laparoscopic sleeve gastrectomy followed by a possible gastric bypass. Dimitris Davis agrees with the surgical choice and has been educated in it's; risks, benefits, and alternatives. We will continue with the pre-operative evaluation as needed to check progress. The patient understands the plan of action    He understands the need for an \"in-office\" visit next month for an accurate weight check and physical exam given his BMI of 85. The patient continues to vape with nicotine-based products and I explained to him that he must be off of all of these products on or about October 1.     Secondary Diagnoses:     Dietary Intervention  - The patient is currently scheduled to see or has been followed by a bariatric nutritionist for an attempt at preoperative weight loss as has been dictated by their insurance carrier. They will be assessed at various times during their follow up to evaluate their progress depending on the length of time that is required once again by their carrier. I have explained the importance of preoperative weight loss and the benefits regarding lower surgical risk and also assisting the patient in reaching their weight loss goal.  Finally they understand their is a physiologic benefit from the standpoint of hepatic volume reduction preoperatively. I have reiterated the importance of a low carbohydrate and high protein regimen to achieve their stated goal.        This visit with Santos Sainz was performed under virtual telemedicine guidelines during the coronavirus (HKCBW-95) public health emergency on 7/29/2020 in a video encounter. They understand that this encounter could be a billable service, with coverage determined by their insurance carrier. They are aware that   they may receive a bill and have provided verbal consent for this visit. This visit was performed with the patient in their home environment and provider was   present at [facility]. I have spent over 30 minutes on this visit  both prior to the visits reviewing the patients chart and with the patient oh the phone. I have reviewed their medical history and discussed the plan of action to date. They understand that they will be asked   to come to the office when our office is allowed normal patient interaction, as dictated by public health officials, for a face-to-face visit to rediscuss all of the things we  have talked about today.   During this visit we discussed the varieties of surgeries that we perform, how they would impact the patient from a weight loss standpoint   considering their medical issues and prior surgeries, and also the restrictions that the patient would have long-term with the operation that they have chosen      Signed By: David Marshall MD     July 29, 2020

## 2020-08-17 ENCOUNTER — CLINICAL SUPPORT (OUTPATIENT)
Dept: SURGERY | Age: 30
End: 2020-08-17

## 2020-08-17 VITALS — BODY MASS INDEX: 53.78 KG/M2 | WEIGHT: 315 LBS | HEIGHT: 64 IN

## 2020-08-17 DIAGNOSIS — E66.01 MORBID OBESITY (HCC): Primary | ICD-10-CM

## 2020-08-17 NOTE — PROGRESS NOTES
Medical Weight Loss Multi-Disciplinary Program    Name: Brandy Schneider   : 1990    Session# 3  Date: 2020     Visit Vitals  Ht 5' 4\" (1.626 m)   Wt (!) 223.2 kg (492 lb)   BMI 84.45 kg/m²       Dietary Instructions    Reviewed intake  Understanding low carbohydrates, low sugar, higher protein meals  Instruction given for personal dietary changes  Discussed perceived compliance  Comments: Diet hx reviewed and personal dietary changes discussed. Reviewed recommendation to follow 6532-2207 calorie diet, working to reduce total carbohydrate intake to  g or less per day and increasing protein intake to  g per day, compared current intake to recommendations. Today the majority of our visit was spent reviewing patient's food recall and identifying areas for improvement. Cut out fried foods and no fast food. Eating a lot of chicken breast without skin and fish. Pt's biggest accomplishments is cutting out fast food. Educated  on the importance of eating 3 meals/day at regularly scheduled times including breakfast within 1st 1-2 hours of waking. Suggested patient use a protein supplement as a meal replacement instead of skipping OR as a between meal high protein snack. Also educated on the importance of including a protein with every meal and snack and reviewed lean sources. Lastly introduced  the Plate Method for Meal Planning and reinforced reading labels and working to track his total intake     Patient notes he has been having improvement edema, more energy -able to move easier/more motivated to continue making behavior change.      Diet History:    Wake-up 9:00-10:00 am   Meal 1 - 10:00 am - 1 premier protein with 1 medium apple  Meal 2 - 2:00 pm - 1 premier protein with 1 banana OR 1 medium orange OR 15-20 grapes   Meal 2 - 6:00 pm - 2 chicken breast OR Baked fish/pork chops with green beans  OR asparagus OR carrots   Fluids: Water,  Eliminated diet soda this month -did replace with sparkling water      Nutritional Hx: What is the number of meals you eat per day? 3  Comment: Protein shake 2 x /day     Do you eat between meals / snack? yes  Typical snack: saltine -(not daily) -reports avoiding snacking over the past ~ 2 weeks     How fast do you eat your meals? rapid    How often do you eat fast food? occasionally    How many sodas/sugared beverages do you drink per day? 1-2 cans of diet soda per day -eliminated soda for this week. Using sugar free water flavoring packets. How many caffeinated drinks do you have per day? 1-2 cans of diet soda per day     How much milk and/or juice do you drink per day? 10-12 oz unsweetened almond milk with slim fast shake     How much water do you drink per day? Improved = 4, 16 oz bottles of water/day (improved from 4, 8oz glasses/day)    How often do you consume alcohol? 1 times a week; 2 Mixed drinks. Pt has been made aware to eliminate/reduce alcohol. Current Vitamins: Multivitamin       Physical Activity/Exercise    Discussed Perceived Compliance  Motivation    Patient has started physical activity regimen, reinforced the importance of regular physical activity for total health and weight management. [employer] chair exercises 3x/week, 15 min (arms and legs) for 1.5 weeks. Suggested starting increasing PA for at least 3 days per week for 30 minutes, patient was receptive to recommendation. Pt states biggest barrier to increasing exercise is \"laziness\". Problem solved for ways to overcome this barrier - pt will schedule his exercise routine for 30 min. 3-5d/week. -Patient has been able to do 20-30 minutes per day      Behavior Modification    Identify obstacles to trigger change  Achieving/Rewarding goals met  Positive attitude  Comments: Reinforced importance continuing to modify lifestyle patterns and behaviors to promote weight loss and long term weight maintenance       Goals:   1.  Work to increase to 3-4 small meals per day, with planned snacks as needed. Recommend following plate method for meal planning - focusing on lean protein, non-starchy vegetables, and measured amounts of starch. - Goal of  g protein and  g carbohydrate per day. - Recommend continuing protein supplement as meal replacement at least 1x/day OR as high protein snack option  2. Increase non caloric fluid to 64 oz per day. Eliminate caffeine, added sugar, carbonation, and straws.               -Continue to work to decrease sugar sweetened beverages - goal of calorie free beverages only              -Must eliminate caffeine prior to surgery and avoid for ~6-8 weeks   -Practice 30:30 rule,  food and flood   3. Start activity regimen, work to increase ADL  4. Start Complete MVI    Candidate for surgery (per RD):  PENDING    Dietitian: Katy Ritchie RD

## 2020-09-14 ENCOUNTER — CLINICAL SUPPORT (OUTPATIENT)
Dept: SURGERY | Age: 30
End: 2020-09-14

## 2020-09-14 VITALS — BODY MASS INDEX: 53.78 KG/M2 | HEIGHT: 64 IN | WEIGHT: 315 LBS

## 2020-09-14 DIAGNOSIS — E66.01 MORBID OBESITY (HCC): Primary | ICD-10-CM

## 2020-09-14 NOTE — PROGRESS NOTES
Medical Weight Loss Multi-Disciplinary Program    Name: Royce Dove   : 1990    Session# 4  Date: 2020     Visit Vitals  Ht 5' 4\" (1.626 m)   Wt (!) 223.2 kg (492 lb)   BMI 84.45 kg/m²       Dietary Instructions    Reviewed intake  Understanding low carbohydrates, low sugar, higher protein meals  Instruction given for personal dietary changes  Discussed perceived compliance  Comments: Diet hx reviewed and personal dietary changes discussed. Reviewed recommendation to follow [id number] calorie diet, working to reduce total carbohydrate intake to   g or less per day and increasing protein intake to  g per day, compared current intake to recommendations. Today the majority of our visit was spent reviewing patient's food recall and identifying areas for improvement. Cut out fried foods and no fast food. Eating a lot of chicken breast without skin and fish. Pt's biggest accomplishments is cutting out fast food. Educated  on the importance of eating 3 meals/day at regularly scheduled times including breakfast within 1st 1-2 hours of waking. Suggested patient aim to add lean protein source to midday meal and utilize protein shake as afternoon OR PM snack - patient notes weight loss has stalled. Denies additional discretionary caloris from snacking. Recommend he aim to utilize food scale to measure protein portion at lunch/dinner to 6-8 oz and recommend ensuring he is consuming at least 1200 calories per day. Discussed fluid status/edema may impact weigh-ins. Additionally recommend he aim to exercise at least 100-150 minutes per week.      Diet History:    Wake-up 8:00-9:00 am   Meal 1 - 9-10:00 am -2 eggs with 1 slice of toast OR 1 apple   Meal 2 - 2:00 pm - Salad greens with oil vinegar dressing with 1 Premier protein shake   Meal 2 - 6:00 pm - 2 chicken breast OR Baked fish/pork chops with green beans  OR asparagus OR carrots   Fluids: Water- 4 bottles per day,  Eliminated diet soda this month -did replace with sparkling water      Nutritional Hx: What is the number of meals you eat per day? 3  Comment: Protein shake 1- 2 x /day     Do you eat between meals / snack? yes  Typical snack: saltine -(not daily) -reports avoiding snacking over the past ~ 2 weeks     How fast do you eat your meals? rapid    How often do you eat fast food? occasionally     How many sodas/sugared beverages do you drink per day? 1-2 cans of diet soda per day -eliminated soda for this week. Using sugar free water flavoring packets. How many caffeinated drinks do you have per day? 1-2 cans of diet soda per day     How much milk and/or juice do you drink per day? 10-12 oz unsweetened almond milk with slim fast shake     How much water do you drink per day? Improved = 4, 16 oz bottles of water/day (improved from 4, 8oz glasses/day)    How often do you consume alcohol? 1 times a week; 2 Mixed drinks. Pt has been made aware to eliminate/reduce alcohol. Current Vitamins: Multivitamin       Physical Activity/Exercise    Discussed Perceived Compliance  Motivation    Patient has started physical activity regimen, reinforced the importance of regular physical activity for total health and weight management. [employer] chair exercises 2-3x/week, 15 min (arms and legs) for 2-3  weeks. Suggested starting increasing PA for at least 3 days per week for 30 minutes, patient was receptive to recommendation. Pt states biggest barrier to increasing exercise is \"laziness\". Problem solved for ways to overcome this barrier - pt will schedule his exercise routine for 30 min. 3-5d/week. -Patient has been able to do 20-30 minutes per day      Behavior Modification    Identify obstacles to trigger change  Achieving/Rewarding goals met  Positive attitude  Comments: Reinforced importance continuing to modify lifestyle patterns and behaviors to promote weight loss and long term weight maintenance       Goals:   1.  Work to increase to 3-4 small meals per day, with planned snacks as needed. Recommend following plate method for meal planning - focusing on lean protein, non-starchy vegetables, and measured amounts of starch. - Goal of  g protein and  g carbohydrate per day. - Recommend continuing protein supplement as meal replacement at least 1x/day OR as high protein snack option  2. Increase non caloric fluid to 64 oz per day. Eliminate caffeine, added sugar, carbonation, and straws.               -Continue to work to decrease sugar sweetened beverages - goal of calorie free beverages only              -Must eliminate caffeine prior to surgery and avoid for ~6-8 weeks   -Practice 30:30 rule,  food and flood   3. Start activity regimen, work to increase ADL  4. Start Complete MVI    Candidate for surgery (per RD):  PENDING    Dietitian: Kristen Fried RD

## 2020-10-12 ENCOUNTER — CLINICAL SUPPORT (OUTPATIENT)
Dept: SURGERY | Age: 30
End: 2020-10-12

## 2020-10-12 VITALS — WEIGHT: 315 LBS | BODY MASS INDEX: 53.78 KG/M2 | HEIGHT: 64 IN

## 2020-10-12 DIAGNOSIS — E66.01 MORBID OBESITY (HCC): Primary | ICD-10-CM

## 2020-10-12 NOTE — PROGRESS NOTES
Medical Weight Loss Multi-Disciplinary Program    Name: Rachel Menendez   : 1990    Session# 5  Date: 10/12/2020     Visit Vitals  Ht 5' 4\" (1.626 m)   Wt (!) 223.2 kg (492 lb)   BMI 84.45 kg/m²       Dietary Instructions    Reviewed intake  Understanding low carbohydrates, low sugar, higher protein meals  Instruction given for personal dietary changes  Discussed perceived compliance  Comments: Diet hx reviewed and personal dietary changes discussed. Reviewed recommendation to follow [id number] calorie diet, working to reduce total carbohydrate intake to   g or less per day and increasing protein intake to  g per day, compared current intake to recommendations. Today the majority of our visit was spent reviewing patient's food recall and identifying areas for improvement. Cut out fried foods and no fast food. Eating a lot of chicken breast without skin and fish. Pt's biggest accomplishments is cutting out fast food. Educated  on the importance of eating 3 meals/day at regularly scheduled times including breakfast within 1st 1-2 hours of waking. Suggested patient aim to add lean protein source to midday meal and utilize protein shake as afternoon OR PM snack - patient notes weight loss has stalled. Denies additional discretionary caloris from snacking. Recommend he aim to utilize food scale to measure protein portion at lunch/dinner to 6-8 oz and recommend ensuring he is consuming at least 1200 calories per day. Discussed fluid status/edema may impact weigh-ins. Additionally recommend he aim to exercise at least 100-150 minutes per week.      Diet History:    Wake-up 8:00-9:00 am   Meal 1 - 9-10:00 am -1 egg with 2 pieces turkey barrios with 1 slice toast   Meal 2 - 2:00 pm - Salad greens ( 1 large bag salad) with oil vinegar dressing with 1 Premier protein shake   Snack: Protein bar (Premier protein)- 290 calories, 8 g fat, 25 g CHO, 30 g protein   Meal 3 - 6:00 pm - 2 chicken breast OR Baked fish/pork chops with green beans  OR asparagus OR carrots (reinforced measuring portions and reducing pm protein 6-7 oz ) - peas and corn   Fluids: Water 3- 4 bottles per day,  Eliminated soda and carbonated beverages     Nutritional Hx: What is the number of meals you eat per day? 3  Comment: Protein shake 1- 2 x /day     Do you eat between meals / snack? yes  Typical snack: saltine -(not daily) -reports avoiding snacking over the past 2+ months     How fast do you eat your meals? rapid    How often do you eat fast food? Occasionally- reports no fast food over the past month      How many sodas/sugared beverages do you drink per day? Eliminated diet soda and sparkling water   How many caffeinated drinks do you have per day? Eliminated soda     How much milk and/or juice do you drink per day? 10-12 oz unsweetened almond milk with slim fast shake     How much water do you drink per day? Improved =3-  4, 16 oz bottles of water/day (improved from 4, 8oz glasses/day)    How often do you consume alcohol? 1 times a week; 2 Mixed drinks. Pt has been made aware to eliminate/reduce alcohol. Current Vitamins: Multivitamin       Physical Activity/Exercise    Discussed Perceived Compliance  Motivation    Patient has started physical activity regimen, reinforced the importance of regular physical activity for total health and weight management. [facility] chair exercises 2-3x/week, 15 min (arms and legs) for 2-3  weeks. Suggested starting increasing PA for at least 3 days per week for 30 minutes, patient was receptive to recommendation. Pt states biggest barrier to increasing exercise is \"laziness\". Problem solved for ways to overcome this barrier - pt will schedule his exercise routine for 30 min. 3-5d/week.    -Patient has been able to do 20-30 minutes per day      Behavior Modification    Identify obstacles to trigger change  Achieving/Rewarding goals met  Positive attitude  Comments: Reinforced importance continuing to modify lifestyle patterns and behaviors to promote weight loss and long term weight maintenance       Goals:   1. Work to increase to 3-4 small meals per day, with planned snacks as needed. Recommend following plate method for meal planning - focusing on lean protein, non-starchy vegetables, and measured amounts of starch. - Goal of  g protein and  g carbohydrate per day. - Recommend continuing protein supplement as meal replacement at least 1x/day OR as high protein snack option  2. Increase non caloric fluid to 64 oz per day. Eliminate caffeine, added sugar, carbonation, and straws.               -Continue to work to decrease sugar sweetened beverages - goal of calorie free beverages only              -Must eliminate caffeine prior to surgery and avoid for ~6-8 weeks   -Practice 30:30 rule,  food and flood   3. Start activity regimen, work to increase ADL  4. Start Complete MVI    Candidate for surgery (per RD):  PENDING    Dietitian: Dasia Singh RD

## 2020-11-16 ENCOUNTER — OFFICE VISIT (OUTPATIENT)
Dept: SURGERY | Age: 30
End: 2020-11-16

## 2020-11-16 DIAGNOSIS — E66.01 MORBID OBESITY (HCC): Primary | ICD-10-CM

## 2020-11-19 VITALS — BODY MASS INDEX: 53.78 KG/M2 | WEIGHT: 315 LBS | HEIGHT: 64 IN

## 2020-11-19 NOTE — PROGRESS NOTES
Medical Weight Loss Multi-Disciplinary Program    Name: Katelyn Guajardo   : 1990    Session# 6  Date: 2020    Visit Vitals  Ht 5' 4\" (1.626 m)   Wt (!) 223.2 kg (492 lb)   BMI 84.45 kg/m²       Dietary Instructions    Reviewed intake  Understanding low carbohydrates, low sugar, higher protein meals  Instruction given for personal dietary changes  Discussed perceived compliance  Comments: Diet hx reviewed and personal dietary changes discussed. Reviewed recommendation to follow [id number] calorie diet, working to reduce total carbohydrate intake to  g or less per day and increasing protein intake to  g per day, compared current intake to recommendations. Patient participated in pre-recorded education class video. Recorded video of dietitian discussing key diet principles following weight loss surgery, importance of high protein diet, sources of protein, tips for following low carbohydrate diet, and ways to measure carbohydrates utilizing carbohydrate counting. Discussed importance of sampling protein supplements, protein supplement label guidelines and popularly selected items. Also reviewed the key vitamins and minerals to supplement long term after surgery. But these vitamins will be reviewed again in a pre-operative class. Patient watched the video in its entirety and turned in the 3 embedded passcodes from the video session. Behavior Modification    Identify obstacles to trigger change  Achieving/Rewarding goals met  Positive attitude  Comments: Reinforced importance continuing to modify lifestyle patterns and behaviors to promote weight loss and long term weight maintenance     Comments:  During today's lesson, I also spent some time talking about behavior changes. I talked to patient about the importance of taking vitamins post op and we reviewed the vitamins that patients will be taking post op. Patient will hear this again at pre op class before surgery.   Patient had the opportunity to ask questions about these vitamins that will be lifelong. Goals:   1. Work to increase to 3-4 small meals per day, with planned snacks as needed. Recommend following plate method for meal planning - focusing on lean protein, non-starchy vegetables, and measured amounts of starch. - Goal of  g protein and  g carbohydrate per day. - Recommend continuing protein supplement as meal replacement at least 1x/day OR as high protein snack option  2. Increase non caloric fluid to 64 oz per day. Eliminate caffeine, added sugar, carbonation, and straws.               -Continue to work to decrease sugar sweetened beverages - goal of calorie free beverages only              -Must eliminate caffeine prior to surgery and avoid for ~6-8 weeks   -Practice 30:30 rule,  food and flood   3. Start activity regimen, work to increase ADL  4. Start Complete MVI    Candidate for surgery (per RD):  PENDING-weight loss goal

## 2020-12-02 ENCOUNTER — HOSPITAL ENCOUNTER (OUTPATIENT)
Age: 30
Setting detail: OUTPATIENT SURGERY
Discharge: HOME | End: 2020-12-02
Attending: SPECIALIST | Admitting: SPECIALIST
Payer: MEDICAID

## 2020-12-02 ENCOUNTER — APPOINTMENT (OUTPATIENT)
Dept: GENERAL RADIOLOGY | Age: 30
End: 2020-12-02
Attending: SPECIALIST
Payer: MEDICAID

## 2020-12-02 VITALS
OXYGEN SATURATION: 99 % | WEIGHT: 315 LBS | SYSTOLIC BLOOD PRESSURE: 140 MMHG | BODY MASS INDEX: 53.78 KG/M2 | TEMPERATURE: 99 F | HEART RATE: 107 BPM | HEIGHT: 64 IN | DIASTOLIC BLOOD PRESSURE: 73 MMHG

## 2020-12-02 DIAGNOSIS — E66.01 MORBID OBESITY (HCC): ICD-10-CM

## 2020-12-02 PROCEDURE — 74240 X-RAY XM UPR GI TRC 1CNTRST: CPT

## 2020-12-02 PROCEDURE — 76040000019: Performed by: SPECIALIST

## 2020-12-02 PROCEDURE — 74011000255 HC RX REV CODE- 255: Performed by: SPECIALIST

## 2020-12-02 PROCEDURE — 2709999900 HC NON-CHARGEABLE SUPPLY: Performed by: SPECIALIST

## 2020-12-02 PROCEDURE — 77030040361 HC SLV COMPR DVT MDII -B: Performed by: SPECIALIST

## 2020-12-02 NOTE — PROCEDURES
Patient:Emmanuel Taylor   : 1990  Medical Record [id number]            PREPROCEDURE DIAGNOSIS: This patient is preoperative for laparoscopic sleeve gastrectomyprocedure with a history of  reflux disease. POSTPROCEDURE DIAGNOSIS: This patient is preoperative for laparoscopic sleeve gastrectomyprocedure with a history of  reflux disease. PROCEDURES PERFORMED: Upper GI study with barium. ESTIMATED BLOOD LOSS: None. SPECIMENS: None. STATEMENT OF MEDICAL NECESSITY: The patient is a patient with a  longstanding history of obesity. They are now considering the laparoscopic sleeve gastrectomyprocedure as a means of surgical weight control and due to their history of reflux disease and are being assessed preoperatively for such. DESCRIPTION OF PROCEDURE: The patient was brought to the fluoroscopy unit and  was given thin barium. On swallowing of barium, they were noted to have  normal peristalsis of their esophagus. They had prompt filling of distal  esophagus with tapering into the gastroesophageal junction. There was no evidence of a hiatal hernia present. Contrast then filled the gastric cardia, fundus,body and pre pyloric region with no abnormalities noted. Contrast then exited the pylorus in normal fashion. No obstruction was noted. There was no evidence of reflux noted. (normal anatomy - BMI of 87.   He needs to lose a significant amount of weight prior to surgery)    Manuel Morfin MD

## (undated) DEVICE — MAJ-1414 SINGLE USE ADPATER BIOPSY VALV: Brand: SINGLE USE ADAPTOR BIOPSY VALVE

## (undated) DEVICE — STERILE POLYISOPRENE POWDER-FREE SURGICAL GLOVES: Brand: PROTEXIS

## (undated) DEVICE — STRAP,POSITIONING,KNEE/BODY,FOAM,4X60": Brand: MEDLINE

## (undated) DEVICE — GOWN ISOLATN REG BLU POLY UNISX W/ THMB LOOP

## (undated) DEVICE — GARMENT,MEDLINE,DVT,INT,CALF,MED, GEN2: Brand: MEDLINE